# Patient Record
(demographics unavailable — no encounter records)

---

## 2024-12-07 NOTE — ASSESSMENT
[FreeTextEntry1] : Tori Alberto is a 57-year-old woman who has a past medical history significant for medullary sponge kidney and numerous prior episodes of urinary tract infections, obstructing calculi, and pyelonephritis. She takes daily prophylactic antibiotics. Ms. Alberto had her first renal colic event in the 1990s. She developed right (?) back pain which lasted for on the order of 3 months. Workup demonstrated an obstructing kidney stone. She underwent surgical removal of the stone. On another occasion she had surgical removal of bladder stones. In September 2023 she had sepsis related to an obstructing kidney stone.  (05/11/2022) Renal ultrasound: R. 9.8 x 4.2 x 4.3 cm. Normal cortical thickness and echogenicity. Multiple calculi filling calyces. L. 7.4 x 2.8 x 3.1 cm with diffuse cortical thinning, no hydronephrosis, and multiple calculi within the cortices.  (05/11/2022) KUB: R. kidney numerous calculi identified within the calyces. No calculi are seen in the course of the right ureter. L. fewer calculi in the calyceal distribution. A larger calculus near the renal pelvis measures 18 x 17 mm. A calculus measuring 3 x 1 mm is within range of the left distal ureter. Note was made of slight scoliosis and mild spondylosis of the lumbar spine. The bones are demineralized.  (08/31/2023) CT scan abdomen/pelvis (no contrast) In addition to bilateral renal calculi, there are parenchymal calcifications in both kidneys consistent with medullary nephrocalcinosis. The right renal collecting system and right ureter are normal in caliber. There is severe left hydroureteronephrosis with multiple (at least7) calculi in the distal left ureter (see series 4, images [id number] and coronal images 45-50). The most distal is at the ureterovesical junction and measures up to 4 mm. The largest of the left ureteral calculi measures up to 8 mm. There is severe left perinephric fat stranding which could be secondary to acute obstructive uropathy, though an upper urinary tract infection could also have this appearance. There are multiple dependent layering calculi within the dilated left renal collecting system, the largest measuring up to 1 cm.   IMPRESSION:  (1) Nephrolithiasis. Ms. Alberto submitted a 24-hour urine collection to [facility]. The prior study demonstrated moderate risks of both calcium oxalate and calcium phosphate formation. Her risk factors included a low urine volume (1.62 L, 1.97 L), low urine citrate. The pH of the urine samples were 6.786 and 7.075.  The urine study of 12/06/2023 demonstrated a low risk of calcium oxalate formation, low risk of uric acid stone formation, and a moderate risk of calcium phosphate formation. The risk factors for calcium phosphate stone formation included a urine pH of 7.398 and urine citrate of 297 mg. The urine volume was 2.17 liters.  -Given the prior risks of calcium oxalate stone formation, she will continue to take potassium citrate. -Given the elevated pH and moderate risk of calcium phosphate formation on the prior 24-hour urine- suggest increasing the oral fluids. -Goal intake of water is 3 liters. -Repeat a 24-hour urine.  -Follow up with Dr. Augie Mendez.  (2) Stage III CKD. The BUN/creatinine levels have trended as follows: 26/1.1 (10/19/2021), 18/1.4 (05/11/2022), 18/1.4 (06/02/2022) --> 25/1.88 (08/31/2023) in hospital --> 16/1.42 (09/02/2023) --> 19/1.32 (12/11/2023) --> 17/1.33 (10/02/2024). Ms. Albetro reports she has been told by urology that her left kidney is functioning at 12% and a left nephrectomy has been recommended.  -Continue to stay well hydrated -Avoid the use of NSAIDS.  (3) Mild centrilobular emphysema. Noted on CT scan of the abdomen and pelvis. -I discussed this with Ms. Alberto. She currently smokes 15 cigarettes daily. We talked about a strategy for quitting smoking. I asked her to decrease her cigarette intake by 1 cigarette per week or two. She is not yet ready to quit. She will consider it.  (4) Elevated Hb (17.4 g/dL) -Although I suspect this is related to the emphysema, it should be worked up.  Consider hematology consult.

## 2024-12-07 NOTE — HISTORY OF PRESENT ILLNESS
[FreeTextEntry1] : Tori Alberto is a 57-year-old woman who has a past medical history significant for medullary sponge kidney and numerous prior kidney infections.  She takes prophylactic antibiotics (cephalexin) in addition to other antibiotics when she has a UTI.    Ms. Alberto had her first renal colic event in the 1990s.  She developed right (?) back pain which lasted for on the order of 3 months.  Workup demonstrated an obstructing kidney stone.  She underwent surgical removal of the stone.  On another occasion she had surgical removal of bladder stones.  On another occasion she had lithotripsy.  She was passing kidney stones in August 2023.   Ms. Alberto is here for follow up.  She re-obstructed due to a L. ureteral kidney stone ~May 2024 and was admitted to [facility] where she underwent nephrostomy tube placement, followed by stent placement in the left ureter. She was told she should have the kidney removed. I understand that Dr. Augie Mendez ais in aggrement. This will be done after the new year.  Remember, in September 2023 she was admitted to [facility] last month.  She had an obstructing stone in her left ureter. A nephrostomy tube was placed.  She developed severe sepsis. She underwent cystoscopy and stent placement with Dr. Andrea Vincent.  The stent and nephrostomy tube were later removed.       Ms. Alberto lives in the [place] part time, and part time in [place].

## 2024-12-07 NOTE — PHYSICAL EXAM
[General Appearance - Alert] : alert [General Appearance - In No Acute Distress] : in no acute distress [FreeTextEntry1] : Thin woman, awake, alert,  NAD [Sclera] : the sclera and conjunctiva were normal [Extraocular Movements] : extraocular movements were intact [Neck Appearance] : the appearance of the neck was normal [] : no respiratory distress [Respiration, Rhythm And Depth] : normal respiratory rhythm and effort [Exaggerated Use Of Accessory Muscles For Inspiration] : no accessory muscle use [Auscultation Breath Sounds / Voice Sounds] : lungs were clear to auscultation bilaterally [Heart Rate And Rhythm] : heart rate was normal and rhythm regular [Heart Sounds] : normal S1 and S2 [Heart Sounds Gallop] : no gallops [Murmurs] : no murmurs [Heart Sounds Pericardial Friction Rub] : no pericardial rub [Edema] : there was no peripheral edema [Bowel Sounds] : normal bowel sounds [Abdomen Soft] : soft [Abdomen Tenderness] : non-tender [No CVA Tenderness] : no ~M costovertebral angle tenderness [Abnormal Walk] : normal gait [Involuntary Movements] : no involuntary movements were seen [Skin Color & Pigmentation] : normal skin color and pigmentation [Skin Turgor] : normal skin turgor [No Focal Deficits] : no focal deficits [Impaired Insight] : insight and judgment were intact [Oriented To Time, Place, And Person] : oriented to person, place, and time [Affect] : the affect was normal [Over the Past 2 Weeks, Have You Felt Down, Depressed, or Hopeless?] : 1.) Over the past 2 weeks, have you felt down, depressed, or hopeless? No [Mood] : the mood was normal [Over the Past 2 Weeks, Have You Felt Little Interest or Pleasure Doing Things?] : 2.) Over the past 2 weeks, have you felt little interest or pleasure doing things? No

## 2024-12-07 NOTE — CONSULT LETTER
[Dear  ___] : Dear  [unfilled], [Consult Letter:] : I had the pleasure of evaluating your patient, [unfilled]. [Please see my note below.] : Please see my note below. [Consult Closing:] : Thank you very much for allowing me to participate in the care of this patient.  If you have any questions, please do not hesitate to contact me. [Sincerely,] : Sincerely, [FreeTextEntry3] : Jerry Valladares [DrSun  ___] : Dr. BOSE [DrSun ___] : Dr. BOSE